# Patient Record
Sex: MALE | Race: BLACK OR AFRICAN AMERICAN | NOT HISPANIC OR LATINO | Employment: STUDENT | URBAN - METROPOLITAN AREA
[De-identification: names, ages, dates, MRNs, and addresses within clinical notes are randomized per-mention and may not be internally consistent; named-entity substitution may affect disease eponyms.]

---
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
## 2024-03-16 NOTE — LETTER
March 25, 2022      [facility]  [address]  [place] [address]  Phone: [phone]       Patient: Manolo Alexander   YOB: 2005  Date of Visit: 03/25/2022    To Whom It May Concern:    Nick Alexander  was at [facility] on 03/25/2022. The patient may return to work/school on on 3/26/22. Please excuse from PE and baseball until further notice. If you have any questions or concerns, or if I can be of further assistance, please do not hesitate to contact me.    Sincerely,    Delaney Burrell PA-C      NEGATIVE

## 2024-08-17 ENCOUNTER — OFFICE VISIT (OUTPATIENT)
Dept: URGENT CARE | Facility: CLINIC | Age: 19
End: 2024-08-17
Payer: COMMERCIAL

## 2024-08-17 VITALS
TEMPERATURE: 99 F | DIASTOLIC BLOOD PRESSURE: 62 MMHG | RESPIRATION RATE: 18 BRPM | SYSTOLIC BLOOD PRESSURE: 128 MMHG | BODY MASS INDEX: 22.84 KG/M2 | HEIGHT: 74 IN | WEIGHT: 177.94 LBS | OXYGEN SATURATION: 100 % | HEART RATE: 83 BPM

## 2024-08-17 DIAGNOSIS — R05.9 COUGH, UNSPECIFIED TYPE: ICD-10-CM

## 2024-08-17 DIAGNOSIS — U07.1 COVID: Primary | ICD-10-CM

## 2024-08-17 LAB
CTP QC/QA: YES
SARS-COV-2 AG RESP QL IA.RAPID: POSITIVE

## 2024-08-17 PROCEDURE — 99213 OFFICE O/P EST LOW 20 MIN: CPT | Mod: S$GLB,,, | Performed by: NURSE PRACTITIONER

## 2024-08-17 PROCEDURE — 87811 SARS-COV-2 COVID19 W/OPTIC: CPT | Mod: QW,S$GLB,, | Performed by: NURSE PRACTITIONER

## 2024-08-17 RX ORDER — DEXBROMPHENIRAMINE MALEATE, DEXTROMETHORPHAN HYDROBROMIDE AND PHENYLEPHRINE HYDROCHLORIDE 2; 15; 7.5 MG/5ML; MG/5ML; MG/5ML
5 LIQUID ORAL
Qty: 120 ML | Refills: 0 | Status: SHIPPED | OUTPATIENT
Start: 2024-08-17

## 2024-08-17 NOTE — PATIENT INSTRUCTIONS
You have tested positive for COVID-19 today.    Per CDC recommendations, if you test positive for COVID-19 you may return to normal activities when, for at least 24 hours, both are true:     Your symptoms are getting better overall, and:  You have not had a fever AND are not using fever reducing medication     The CDC also recommends added precautions in the 5 days after return to normal activity including frequent hand washing, mask wearing, physical distancing.   CDC also recommends that if you develop a fever or starts to feel worse after you have returned to normal activities, you should return home and away from others for at least another 24 hours. The link below is a direct link to the [facility] website with all this information.  Brantussin DM is a combination cough medication:  Dexbrompheniramine is an antihistamine that reduces the effects of natural chemical histamine in the body. Histamine can produce symptoms of sneezing, itching, watery eyes, and runny nose.  Dextromethorphan is a cough suppressant. It affects the signals in the brain that trigger cough reflex.  Phenylephrine is a decongestant that shrinks blood vessels in the nasal passages. Dilated blood vessels can cause nasal congestion (stuffy nose).  Dexbrompheniramine, dextromethorphan, and phenylephrine is a combination medicine used to treat runny or stuffy nose, sneezing, itching, watery eyes, cough, and sinus congestion caused by allergies, the common cold, or the flu.  Please avoid any additional medications in the same class as those stated above.

## 2024-08-17 NOTE — PROGRESS NOTES
"Subjective:      Patient ID: Manolo Alexander is a 18 y.o. male.    Vitals:  height is 6' 2" (1.88 m) and weight is 80.7 kg (177 lb 14.6 oz). His tympanic temperature is 98.5 °F (36.9 °C). His blood pressure is 128/62 and his pulse is 83. His respiration is 18 and oxygen saturation is 100%.     Chief Complaint: Sinus Problem    Manolo Alexander is a 18 year old male whom presents to urgent care for evaluation of  sore throat,  headache and cough. Symptoms started 5 days ago.      Sinus Problem  This is a new problem. The problem has been gradually worsening since onset. Associated symptoms include chills, coughing, diaphoresis, headaches and a sore throat. Past treatments include acetaminophen. The treatment provided mild relief.       Constitution: Positive for chills and sweating.   HENT:  Positive for sore throat.    Respiratory:  Positive for cough.    Neurological:  Positive for headaches.      Objective:     Physical Exam   Constitutional: He is oriented to person, place, and time. He appears well-developed. He is cooperative.   HENT:   Head: Normocephalic and atraumatic.   Ears:   Right Ear: Hearing, tympanic membrane, external ear and ear canal normal.   Left Ear: Hearing, tympanic membrane, external ear and ear canal normal.   Nose: Nose normal. No mucosal edema or nasal deformity. No epistaxis. Right sinus exhibits no maxillary sinus tenderness and no frontal sinus tenderness. Left sinus exhibits no maxillary sinus tenderness and no frontal sinus tenderness.   Mouth/Throat: Uvula is midline, oropharynx is clear and moist and mucous membranes are normal. No trismus in the jaw. Normal dentition. No uvula swelling.   Eyes: Conjunctivae and lids are normal.   Neck: Trachea normal and phonation normal. Neck supple.   Cardiovascular: Normal rate, regular rhythm, normal heart sounds and normal pulses.   Pulmonary/Chest: Effort normal and breath sounds normal.   Abdominal: Normal appearance and bowel sounds " are normal. Soft.   Musculoskeletal: Normal range of motion.         General: Normal range of motion.   Neurological: He is alert and oriented to person, place, and time. He exhibits normal muscle tone.   Skin: Skin is warm, dry and intact.   Psychiatric: His speech is normal and behavior is normal. Judgment and thought content normal.   Nursing note and vitals reviewed.    Results for orders placed or performed in visit on 08/17/24   SARS Coronavirus 2 Antigen, POCT Manual Read   Result Value Ref Range    SARS Coronavirus 2 Antigen Positive (A) Negative     Acceptable Yes        Assessment:     1. COVID    2. Cough, unspecified type        Plan:       COVID  -     dexbrompheniramine-phenylep-DM 2-7.5-15 mg/5 mL Liqd; Take 5 mLs by mouth every 6 to 8 hours as needed (cough, congestion).  Dispense: 120 mL; Refill: 0    Cough, unspecified type  -     SARS Coronavirus 2 Antigen, POCT Manual Read          Medical Decision Making:   Differential Diagnosis:   Bronchitis, COPD/Asthma exacerbation, Viral upper respiratory infection, Bacterial upper respiratory infection, Pneumonia, covid19, influenza,bacterial vs viral sinusitis.     Clinical Tests:   Lab Tests: Ordered and Reviewed       <> Summary of Lab: Covid positive   Urgent Care Management:    Isolation precautions given. Advised must be fever free for at least 24 hours to discontinue isolation. Patient verbalizes understanding .  Previous encounters  and labs were independently reviewed. Discussed positive COVID-19 results with patient. Advised patient to begin symptomatic treatment. OTC symptomatic treatment options were discussed. Patient was instructed to  Get plenty of rest and drink plenty of fluids. Advised patient to return here or go to the Emergency Department for any concerns or worsening of condition. Advised patient to take tylenol (acetominophen) for fever ( that is intolerable), chills or body aches every 4 hours but not to exceed 4000  mg/ day. Patient vitals stable. Patient has no questions or concerns at this time, all questions were answered before discharge. Patient given handout with discharge instructions. Patient exits exam room in no acute distress.                  Patient Instructions   You have tested positive for COVID-19 today.    Per CDC recommendations, if you test positive for COVID-19 you may return to normal activities when, for at least 24 hours, both are true:     Your symptoms are getting better overall, and:  You have not had a fever AND are not using fever reducing medication     The CDC also recommends added precautions in the 5 days after return to normal activity including frequent hand washing, mask wearing, physical distancing.   CDC also recommends that if you develop a fever or starts to feel worse after you have returned to normal activities, you should return home and away from others for at least another 24 hours. The link below is a direct link to the [facility] website with all this information.  Brantussin DM is a combination cough medication:  Dexbrompheniramine is an antihistamine that reduces the effects of natural chemical histamine in the body. Histamine can produce symptoms of sneezing, itching, watery eyes, and runny nose.  Dextromethorphan is a cough suppressant. It affects the signals in the brain that trigger cough reflex.  Phenylephrine is a decongestant that shrinks blood vessels in the nasal passages. Dilated blood vessels can cause nasal congestion (stuffy nose).  Dexbrompheniramine, dextromethorphan, and phenylephrine is a combination medicine used to treat runny or stuffy nose, sneezing, itching, watery eyes, cough, and sinus congestion caused by allergies, the common cold, or the flu.  Please avoid any additional medications in the same class as those stated above.